# Patient Record
Sex: FEMALE | Race: AMERICAN INDIAN OR ALASKA NATIVE
[De-identification: names, ages, dates, MRNs, and addresses within clinical notes are randomized per-mention and may not be internally consistent; named-entity substitution may affect disease eponyms.]

---

## 2021-12-12 ENCOUNTER — HOSPITAL ENCOUNTER (EMERGENCY)
Dept: HOSPITAL 5 - ED | Age: 41
LOS: 1 days | Discharge: HOME | End: 2021-12-13
Payer: COMMERCIAL

## 2021-12-12 VITALS — SYSTOLIC BLOOD PRESSURE: 107 MMHG | DIASTOLIC BLOOD PRESSURE: 71 MMHG

## 2021-12-12 DIAGNOSIS — M25.562: Primary | ICD-10-CM

## 2021-12-12 DIAGNOSIS — F10.20: ICD-10-CM

## 2021-12-12 DIAGNOSIS — F17.200: ICD-10-CM

## 2021-12-12 PROCEDURE — 73562 X-RAY EXAM OF KNEE 3: CPT

## 2021-12-12 PROCEDURE — 96372 THER/PROPH/DIAG INJ SC/IM: CPT

## 2021-12-12 PROCEDURE — 99283 EMERGENCY DEPT VISIT LOW MDM: CPT

## 2021-12-13 NOTE — EMERGENCY DEPARTMENT REPORT
HPI





- General


Chief Complaint: Extremity Injury, Lower


Time Seen by Provider: 12/13/21 00:56





- HPI


HPI: 





41-year-old -American female presents to the emergency department with a 

complaint of a 1 week history of left knee pain that occurs when the patient is 

bearing weight, ambulating or walking upstairs.  She says that she feels some 

type of a twitch in the knee.  The patient says that she has a history of 

bilateral knee arthritis and "last time the right knee was bothering me in this 

time of the left."  She denies any trauma, injury, or known inciting event.  She

also has a history of hypertension.  She has not taken anything for symptoms p

rior to presentation.  She denies any skin color change, lower extremity 

swelling, numbness.





ED Past Medical Hx





- Past Medical History


Hx Arthritis: Yes (KNEES)


Hx Psychiatric Treatment: Yes (PANIC ATTACKS)


Additional medical history: HERPES





- Surgical History


Additional Surgical History: nose surgery/NASAL FX





- Social History


Smoking Status: Current Every Day Smoker


Substance Use Type: Alcohol





- Medications


Home Medications: 


                                Home Medications











 Medication  Instructions  Recorded  Confirmed  Last Taken  Type


 


Acyclovir [Zovirax Cap] 400 mg PO Q8H #60 cap 04/15/15  Unknown Rx


 


Ibuprofen [Motrin 800 MG tab] 800 mg PO Q8HR PRN #20 tablet 12/13/21  Unknown Rx














ED Review of Systems


ROS: 


Stated complaint: LEFT LEG PAIN


Other details as noted in HPI





Comment: All other systems reviewed and negative


Constitutional: denies: chills, fever


Musculoskeletal: arthralgia.  denies: joint swelling


Skin: denies: rash, change in color


Neurological: denies: weakness, numbness





Physical Exam





- Physical Exam


Vital Signs: 


                                   Vital Signs











  12/12/21





  22:29


 


Temperature 98 F


 


Pulse Rate 89


 


Respiratory 17





Rate 


 


Blood Pressure 107/71





[Right] 


 


O2 Sat by Pulse 99





Oximetry 











Physical Exam: 





GENERAL: The patient is well-developed well-nourished.


HENT: Normocephalic.  Atraumatic.    Patient has moist mucous membranes.


EYES: Extraocular motions are intact.  Pupils equal reactive to light 

bilaterally.


NECK: Supple. Trachea is midline.


SKIN: Skin is warm and dry. 


NEURO: The patient is awake, alert, and oriented.  The patient is cooperative.  

Normal speech.


MUSCULOSKELETAL: Unable to reproduce left knee pain to palpation.  Negative 

anterior posterior drawer test.  No laxity with valgus or varus stress.  

Dorsalis pedis pulse +2/4 and capillary refill less than 2 seconds to the 

affected left lower extremity.  There is no limitation range of motion.  





ED Course


                                   Vital Signs











  12/12/21





  22:29


 


Temperature 98 F


 


Pulse Rate 89


 


Respiratory 17





Rate 


 


Blood Pressure 107/71





[Right] 


 


O2 Sat by Pulse 99





Oximetry 














ED Medical Decision Making





- Radiology Data


Radiology results: image reviewed


interpreted by me: 





X-ray of the left knee does not show any fracture, dislocation, or any acute 

process.





- Medical Decision Making





This patient presents with atraumatic left knee pain.  She denies any swelling, 

skin color change, rash, lesions.  On examination there is no significant 

reproducible tenderness to palpation.  Negative drawer test.  No laxity with 

valgus or varus stress.  The patient's pain occurs when she is ambulating or 

walking upstairs and she feels some type of twinge.  X-ray does not show any 

fracture, dislocation, or any acute process.  She was given a shot of Toradol 

and upon reevaluation says she is feeling improved.  Patient will be discharged 

home with a prescription for ibuprofen and an outpatient referral for 

orthopedics.


Critical Care Time: No


Critical care attestation.: 


If time is entered above; I have spent that time in minutes in the direct care 

of this critically ill patient, excluding procedure time.








ED Disposition


Clinical Impression: 


Left knee pain


Qualifiers:


 Chronicity: unspecified Qualified Code(s): M25.562 - Pain in left knee





Disposition: 01 HOME / SELF CARE / HOMELESS


Is pt being admited?: No


Condition: Stable


Instructions:  Acute Knee Pain, Adult


Additional Instructions: 


Please follow-up with a primary care physician in the next few days.





I am giving you a referral for a local orthopedist, Dr. Pena, to follow-up 

regarding your left knee pain.





Return to the emergency department with any worsening of your symptoms, new or 

concerning symptoms not addressed during this current emergency department 

visit, or with any acute distress.


Prescriptions: 


Ibuprofen [Motrin 800 MG tab] 800 mg PO Q8HR PRN #20 tablet


 PRN Reason: Pain , Severe (7-10)


Referrals: 


PARISH LAWRENCE MD [Primary Care Provider] - 2-3 Days


KAREEN PENA MD [Staff Physician] - 2-3 Days


Time of Disposition: 02:45

## 2021-12-13 NOTE — XRAY REPORT
XR knee 3V LT



INDICATION / CLINICAL INFORMATION:

left knee pain



COMPARISON:

None available.

 

FINDINGS:



BONES / JOINT(S): No acute fracture or subluxation. Mild tricompartmental osteoarthritis. No signific
ant joint effusion.



SOFT TISSUES: No significant abnormality.



ADDITIONAL FINDINGS: None.



IMPRESSION:

No acute osseous findings in the left knee.



Signer Name: Julian Mack MD 

Signed: 12/13/2021 1:23 AM

Workstation Name: [email or web address]